# Patient Record
Sex: MALE | Race: WHITE | NOT HISPANIC OR LATINO | Employment: FULL TIME | URBAN - METROPOLITAN AREA
[De-identification: names, ages, dates, MRNs, and addresses within clinical notes are randomized per-mention and may not be internally consistent; named-entity substitution may affect disease eponyms.]

---

## 2023-03-16 ENCOUNTER — TELEPHONE (OUTPATIENT)
Dept: PRIMARY CARE | Facility: CLINIC | Age: 58
End: 2023-03-16
Payer: COMMERCIAL

## 2023-03-16 NOTE — TELEPHONE ENCOUNTER
Pt called wanted to know if he can combine his follow up for his losartan refill and his physical together in may or does he have to come sooner

## 2023-04-05 LAB
ANION GAP IN SER/PLAS: 16 MMOL/L (ref 10–20)
CALCIUM (MG/DL) IN SER/PLAS: 10.4 MG/DL (ref 8.6–10.6)
CARBON DIOXIDE, TOTAL (MMOL/L) IN SER/PLAS: 28 MMOL/L (ref 21–32)
CHLORIDE (MMOL/L) IN SER/PLAS: 100 MMOL/L (ref 98–107)
CREATININE (MG/DL) IN SER/PLAS: 1.73 MG/DL (ref 0.5–1.3)
GFR MALE: 45 ML/MIN/1.73M2
GLUCOSE (MG/DL) IN SER/PLAS: 101 MG/DL (ref 74–99)
POTASSIUM (MMOL/L) IN SER/PLAS: 4.1 MMOL/L (ref 3.5–5.3)
SODIUM (MMOL/L) IN SER/PLAS: 140 MMOL/L (ref 136–145)
UREA NITROGEN (MG/DL) IN SER/PLAS: 26 MG/DL (ref 6–23)

## 2023-04-07 LAB
ANION GAP IN SER/PLAS: 14 MMOL/L (ref 10–20)
CALCIUM (MG/DL) IN SER/PLAS: 9.9 MG/DL (ref 8.6–10.6)
CARBON DIOXIDE, TOTAL (MMOL/L) IN SER/PLAS: 30 MMOL/L (ref 21–32)
CHLORIDE (MMOL/L) IN SER/PLAS: 99 MMOL/L (ref 98–107)
CREATININE (MG/DL) IN SER/PLAS: 1.58 MG/DL (ref 0.5–1.3)
GFR MALE: 51 ML/MIN/1.73M2
GLUCOSE (MG/DL) IN SER/PLAS: 95 MG/DL (ref 74–99)
POTASSIUM (MMOL/L) IN SER/PLAS: 4 MMOL/L (ref 3.5–5.3)
SODIUM (MMOL/L) IN SER/PLAS: 139 MMOL/L (ref 136–145)
UREA NITROGEN (MG/DL) IN SER/PLAS: 21 MG/DL (ref 6–23)

## 2023-05-04 ENCOUNTER — OFFICE VISIT (OUTPATIENT)
Dept: PRIMARY CARE | Facility: CLINIC | Age: 58
End: 2023-05-04
Payer: COMMERCIAL

## 2023-05-04 VITALS
HEIGHT: 71 IN | HEART RATE: 107 BPM | WEIGHT: 200 LBS | SYSTOLIC BLOOD PRESSURE: 137 MMHG | TEMPERATURE: 97.7 F | DIASTOLIC BLOOD PRESSURE: 78 MMHG | BODY MASS INDEX: 28 KG/M2

## 2023-05-04 DIAGNOSIS — N35.912 STRICTURE OF BULBOUS URETHRA IN MALE, UNSPECIFIED STRICTURE TYPE: ICD-10-CM

## 2023-05-04 DIAGNOSIS — I10 HYPERTENSION, UNSPECIFIED TYPE: Primary | ICD-10-CM

## 2023-05-04 PROCEDURE — 1036F TOBACCO NON-USER: CPT | Performed by: FAMILY MEDICINE

## 2023-05-04 PROCEDURE — 3075F SYST BP GE 130 - 139MM HG: CPT | Performed by: FAMILY MEDICINE

## 2023-05-04 PROCEDURE — 3078F DIAST BP <80 MM HG: CPT | Performed by: FAMILY MEDICINE

## 2023-05-04 PROCEDURE — 99213 OFFICE O/P EST LOW 20 MIN: CPT | Performed by: FAMILY MEDICINE

## 2023-05-04 RX ORDER — AZELASTINE 1 MG/ML
2 SPRAY, METERED NASAL 2 TIMES DAILY
COMMUNITY
Start: 2021-04-02 | End: 2023-05-04 | Stop reason: WASHOUT

## 2023-05-04 RX ORDER — FLUTICASONE PROPIONATE AND SALMETEROL 250; 50 UG/1; UG/1
1 POWDER RESPIRATORY (INHALATION) 2 TIMES DAILY
COMMUNITY
Start: 2020-07-29 | End: 2023-10-24 | Stop reason: SDUPTHER

## 2023-05-04 RX ORDER — LOSARTAN POTASSIUM AND HYDROCHLOROTHIAZIDE 12.5; 5 MG/1; MG/1
1 TABLET ORAL DAILY
COMMUNITY
End: 2023-05-04 | Stop reason: SDUPTHER

## 2023-05-04 RX ORDER — LOSARTAN POTASSIUM AND HYDROCHLOROTHIAZIDE 12.5; 5 MG/1; MG/1
1 TABLET ORAL DAILY
Qty: 90 TABLET | Refills: 3 | Status: SHIPPED | OUTPATIENT
Start: 2023-05-04 | End: 2023-12-04 | Stop reason: SDUPTHER

## 2023-05-04 RX ORDER — FLUTICASONE PROPIONATE AND SALMETEROL 250; 50 UG/1; UG/1
1 POWDER RESPIRATORY (INHALATION) 2 TIMES DAILY
COMMUNITY
Start: 2021-06-30 | End: 2023-10-23 | Stop reason: SDUPTHER

## 2023-05-04 RX ORDER — ALBUTEROL SULFATE 90 UG/1
2 AEROSOL, METERED RESPIRATORY (INHALATION) EVERY 4 HOURS PRN
COMMUNITY
Start: 2020-07-29 | End: 2023-10-24 | Stop reason: SDUPTHER

## 2023-05-04 NOTE — PROGRESS NOTES
This is a 58-year-old male patient who is here for follow-up      He is known to have hypertension but lately she is being worked up for a urethral stricture and will be getting urethroplasty done in June      Blood pressure is really good today    Because he was retaining urine his glomerular filtration rate is also low it has been affecting his kidney functions    He has not been using the inhalers much allergies are under control    I did not prescription for Hyzaar and advised him to come back for his annual physical

## 2023-05-30 LAB
POC CREATININE: 0.8 MG/DL (ref 0.6–1.3)
POCT GFR - DATA CONVERSION: >60

## 2023-06-07 ENCOUNTER — OFFICE VISIT (OUTPATIENT)
Dept: PRIMARY CARE | Facility: CLINIC | Age: 58
End: 2023-06-07
Payer: COMMERCIAL

## 2023-06-07 VITALS
HEART RATE: 82 BPM | WEIGHT: 201 LBS | DIASTOLIC BLOOD PRESSURE: 80 MMHG | SYSTOLIC BLOOD PRESSURE: 133 MMHG | TEMPERATURE: 97.3 F | HEIGHT: 71 IN | BODY MASS INDEX: 28.14 KG/M2

## 2023-06-07 DIAGNOSIS — Z00.00 HEALTHCARE MAINTENANCE: ICD-10-CM

## 2023-06-07 DIAGNOSIS — D22.9 NEVUS: Primary | ICD-10-CM

## 2023-06-07 LAB
ALANINE AMINOTRANSFERASE (SGPT) (U/L) IN SER/PLAS: 30 U/L (ref 10–52)
ALBUMIN (G/DL) IN SER/PLAS: 4.6 G/DL (ref 3.4–5)
ALKALINE PHOSPHATASE (U/L) IN SER/PLAS: 71 U/L (ref 33–120)
ANION GAP IN SER/PLAS: 15 MMOL/L (ref 10–20)
ASPARTATE AMINOTRANSFERASE (SGOT) (U/L) IN SER/PLAS: 25 U/L (ref 9–39)
BASOPHILS (10*3/UL) IN BLOOD BY AUTOMATED COUNT: 0.05 X10E9/L (ref 0–0.1)
BASOPHILS/100 LEUKOCYTES IN BLOOD BY AUTOMATED COUNT: 0.9 % (ref 0–2)
BILIRUBIN TOTAL (MG/DL) IN SER/PLAS: 0.7 MG/DL (ref 0–1.2)
CALCIUM (MG/DL) IN SER/PLAS: 10 MG/DL (ref 8.6–10.6)
CARBON DIOXIDE, TOTAL (MMOL/L) IN SER/PLAS: 27 MMOL/L (ref 21–32)
CHLORIDE (MMOL/L) IN SER/PLAS: 102 MMOL/L (ref 98–107)
CHOLESTEROL (MG/DL) IN SER/PLAS: 179 MG/DL (ref 0–199)
CHOLESTEROL IN HDL (MG/DL) IN SER/PLAS: 53.8 MG/DL
CHOLESTEROL/HDL RATIO: 3.3
CREATININE (MG/DL) IN SER/PLAS: 1.36 MG/DL (ref 0.5–1.3)
EOSINOPHILS (10*3/UL) IN BLOOD BY AUTOMATED COUNT: 0.13 X10E9/L (ref 0–0.7)
EOSINOPHILS/100 LEUKOCYTES IN BLOOD BY AUTOMATED COUNT: 2.2 % (ref 0–6)
ERYTHROCYTE DISTRIBUTION WIDTH (RATIO) BY AUTOMATED COUNT: 13 % (ref 11.5–14.5)
ERYTHROCYTE MEAN CORPUSCULAR HEMOGLOBIN CONCENTRATION (G/DL) BY AUTOMATED: 35.6 G/DL (ref 32–36)
ERYTHROCYTE MEAN CORPUSCULAR VOLUME (FL) BY AUTOMATED COUNT: 104 FL (ref 80–100)
ERYTHROCYTES (10*6/UL) IN BLOOD BY AUTOMATED COUNT: 3.71 X10E12/L (ref 4.5–5.9)
GFR MALE: 61 ML/MIN/1.73M2
GLUCOSE (MG/DL) IN SER/PLAS: 93 MG/DL (ref 74–99)
HEMATOCRIT (%) IN BLOOD BY AUTOMATED COUNT: 38.5 % (ref 41–52)
HEMOGLOBIN (G/DL) IN BLOOD: 13.7 G/DL (ref 13.5–17.5)
IMMATURE GRANULOCYTES/100 LEUKOCYTES IN BLOOD BY AUTOMATED COUNT: 0.7 % (ref 0–0.9)
LDL: 78 MG/DL (ref 0–99)
LEUKOCYTES (10*3/UL) IN BLOOD BY AUTOMATED COUNT: 5.9 X10E9/L (ref 4.4–11.3)
LYMPHOCYTES (10*3/UL) IN BLOOD BY AUTOMATED COUNT: 1.04 X10E9/L (ref 1.2–4.8)
LYMPHOCYTES/100 LEUKOCYTES IN BLOOD BY AUTOMATED COUNT: 17.7 % (ref 13–44)
MONOCYTES (10*3/UL) IN BLOOD BY AUTOMATED COUNT: 0.47 X10E9/L (ref 0.1–1)
MONOCYTES/100 LEUKOCYTES IN BLOOD BY AUTOMATED COUNT: 8 % (ref 2–10)
NEUTROPHILS (10*3/UL) IN BLOOD BY AUTOMATED COUNT: 4.15 X10E9/L (ref 1.2–7.7)
NEUTROPHILS/100 LEUKOCYTES IN BLOOD BY AUTOMATED COUNT: 70.5 % (ref 40–80)
NON HDL CHOLESTEROL: 125 MG/DL
NRBC (PER 100 WBCS) BY AUTOMATED COUNT: 0 /100 WBC (ref 0–0)
PLATELETS (10*3/UL) IN BLOOD AUTOMATED COUNT: 239 X10E9/L (ref 150–450)
POTASSIUM (MMOL/L) IN SER/PLAS: 4.3 MMOL/L (ref 3.5–5.3)
PROSTATE SPECIFIC ANTIGEN,SCREEN: 2.23 NG/ML (ref 0–4)
PROTEIN TOTAL: 7.3 G/DL (ref 6.4–8.2)
SODIUM (MMOL/L) IN SER/PLAS: 140 MMOL/L (ref 136–145)
THYROTROPIN (MIU/L) IN SER/PLAS BY DETECTION LIMIT <= 0.05 MIU/L: 1.56 MIU/L (ref 0.44–3.98)
TRIGLYCERIDE (MG/DL) IN SER/PLAS: 236 MG/DL (ref 0–149)
UREA NITROGEN (MG/DL) IN SER/PLAS: 21 MG/DL (ref 6–23)
VLDL: 47 MG/DL (ref 0–40)

## 2023-06-07 PROCEDURE — 84443 ASSAY THYROID STIM HORMONE: CPT

## 2023-06-07 PROCEDURE — 84153 ASSAY OF PSA TOTAL: CPT

## 2023-06-07 PROCEDURE — 99396 PREV VISIT EST AGE 40-64: CPT | Performed by: FAMILY MEDICINE

## 2023-06-07 PROCEDURE — 1036F TOBACCO NON-USER: CPT | Performed by: FAMILY MEDICINE

## 2023-06-07 PROCEDURE — 80053 COMPREHEN METABOLIC PANEL: CPT

## 2023-06-07 PROCEDURE — 80061 LIPID PANEL: CPT

## 2023-06-07 PROCEDURE — 85025 COMPLETE CBC W/AUTO DIFF WBC: CPT

## 2023-06-07 NOTE — PROGRESS NOTES
"Subjective   Patient ID: Jef Sheikh is a 58 y.o. male who presents for Annual Exam.    HPI     Review of Systems   All other systems reviewed and are negative.      Objective   /80   Pulse 82   Temp 36.3 °C (97.3 °F)   Ht 1.803 m (5' 11\")   Wt 91.2 kg (201 lb)   BMI 28.03 kg/m²     Physical Exam  Eyes:      Pupils: Pupils are equal, round, and reactive to light.   Cardiovascular:      Rate and Rhythm: Normal rate.   Pulmonary:      Effort: Pulmonary effort is normal.   Abdominal:      General: Abdomen is flat.   Musculoskeletal:         General: Normal range of motion.      Cervical back: Normal range of motion.   Skin:     General: Skin is warm.   Neurological:      General: No focal deficit present.      Mental Status: He is alert.         Assessment/Plan     I saw and evaluated the patient. I personally obtained the key and critical portions of the history and physical exam. I reviewed the student 's documentation and discussed the patient with the student. I agree with the student medical decision making as documented in the student's note    This is a 58-year-old male patient here for his annual physical exam    His blood pressure is much better continuing to take Hyzaar    Both the ears have very hard wax advised him to use hydrogen peroxide 2 to 3 drops for 2 weeks and maybe repeat that in another couple of months    When he comes back for his follow-up in 6 months I will examine his ears again and if necessary attempt to syringe out the wax    I will draw all the routine blood work    Advised him to see the dermatologist         "

## 2023-06-07 NOTE — PROGRESS NOTES
Pt is a 57 yo male reporting for annual physical.     Patient states that he is feeling good. He exercises daily, does a lot of walking, elliptical, and weights. He aims to lose 15 lb but is aware that diet can be improved. Has BP monitor in a drawer but is not consistently using it. States that he sleeps pretty well.     I advised pt to try to use BP monitor at least weekly for his own benefit.     Pt has not yet seen sleep apnea specialist.    June 22nd is the date scheduled for his urethroplasty. Abdominal and pelvic CT was just performed last week.     Cologuard was last done in 2021.     Pt sees eye doctor and dentist regularly. I advised pt to schedule appointment with dermatologist as soon as possible to take a look at nevi.     I also advised patient on how to remove ear wax using hydrogen peroxide, as he had substantial amount of buildup bilaterally upon inspection.     Follow-up is scheduled for 6 months.    Meds  Albuterol  Fluticasone  Flonase (OTC)  Losartan

## 2023-06-07 NOTE — PATIENT INSTRUCTIONS
For your ear wax, use hydrogen peroxide and apply 2-3 drops in each ear. Lie down on one side and apply, leave for 1 min, then turn to other side and do the same. Please do this for 2 weeks now, and then again in 3 months. We will take a look at your ears at your next follow-up in 6 months.  Please schedule appointment with dermatologist at  as soon as possible to take a look at your skin.

## 2023-06-22 ENCOUNTER — HOSPITAL ENCOUNTER (OUTPATIENT)
Dept: DATA CONVERSION | Facility: HOSPITAL | Age: 58
End: 2023-06-22
Attending: STUDENT IN AN ORGANIZED HEALTH CARE EDUCATION/TRAINING PROGRAM | Admitting: STUDENT IN AN ORGANIZED HEALTH CARE EDUCATION/TRAINING PROGRAM
Payer: COMMERCIAL

## 2023-06-22 DIAGNOSIS — G47.33 OBSTRUCTIVE SLEEP APNEA (ADULT) (PEDIATRIC): ICD-10-CM

## 2023-06-22 DIAGNOSIS — J45.909 UNSPECIFIED ASTHMA, UNCOMPLICATED (HHS-HCC): ICD-10-CM

## 2023-06-22 DIAGNOSIS — I10 ESSENTIAL (PRIMARY) HYPERTENSION: ICD-10-CM

## 2023-06-22 DIAGNOSIS — N35.919 UNSPECIFIED URETHRAL STRICTURE, MALE, UNSPECIFIED SITE: ICD-10-CM

## 2023-06-22 DIAGNOSIS — N40.0 BENIGN PROSTATIC HYPERPLASIA WITHOUT LOWER URINARY TRACT SYMPTOMS: ICD-10-CM

## 2023-08-30 PROBLEM — R33.9 URINARY RETENTION: Status: ACTIVE | Noted: 2023-08-30

## 2023-08-30 PROBLEM — K43.9 VENTRAL HERNIA: Status: ACTIVE | Noted: 2023-08-30

## 2023-08-30 PROBLEM — G47.33 OBSTRUCTIVE SLEEP APNEA OF ADULT: Status: ACTIVE | Noted: 2023-08-30

## 2023-08-30 PROBLEM — N35.919 URETHRAL STRICTURE: Status: ACTIVE | Noted: 2023-08-30

## 2023-08-30 PROBLEM — I10 BENIGN ESSENTIAL HTN: Status: ACTIVE | Noted: 2023-08-30

## 2023-08-30 PROBLEM — D22.9 MULTIPLE NEVI: Status: ACTIVE | Noted: 2023-08-30

## 2023-08-30 PROBLEM — R79.89 ELEVATED SERUM CREATININE: Status: ACTIVE | Noted: 2023-08-30

## 2023-08-30 RX ORDER — AZELASTINE 1 MG/ML
2 SPRAY, METERED NASAL 2 TIMES DAILY
COMMUNITY
Start: 2021-04-02

## 2023-08-30 RX ORDER — FLUTICASONE PROPIONATE 50 MCG
2 SPRAY, SUSPENSION (ML) NASAL 2 TIMES DAILY
COMMUNITY
Start: 2021-04-02

## 2023-08-30 RX ORDER — SULFAMETHOXAZOLE AND TRIMETHOPRIM 800; 160 MG/1; MG/1
1 TABLET ORAL 2 TIMES DAILY
COMMUNITY
Start: 2023-06-20 | End: 2023-09-08 | Stop reason: ALTCHOICE

## 2023-09-07 VITALS — HEIGHT: 71 IN | WEIGHT: 201.28 LBS | BODY MASS INDEX: 28.18 KG/M2

## 2023-09-08 ENCOUNTER — TELEMEDICINE (OUTPATIENT)
Dept: PRIMARY CARE | Facility: CLINIC | Age: 58
End: 2023-09-08
Payer: COMMERCIAL

## 2023-09-08 ENCOUNTER — APPOINTMENT (OUTPATIENT)
Dept: PRIMARY CARE | Facility: CLINIC | Age: 58
End: 2023-09-08
Payer: COMMERCIAL

## 2023-09-08 DIAGNOSIS — J01.90 ACUTE NON-RECURRENT SINUSITIS, UNSPECIFIED LOCATION: Primary | ICD-10-CM

## 2023-09-08 PROCEDURE — 99213 OFFICE O/P EST LOW 20 MIN: CPT | Performed by: FAMILY MEDICINE

## 2023-09-08 RX ORDER — DOXYCYCLINE 100 MG/1
100 CAPSULE ORAL 2 TIMES DAILY
Qty: 14 CAPSULE | Refills: 0 | Status: SHIPPED | OUTPATIENT
Start: 2023-09-08 | End: 2023-09-15

## 2023-09-08 ASSESSMENT — LIFESTYLE VARIABLES
HISTORY_OF_SMOKING: I HAVE NEVER SMOKED
HISTORY_OF_SMOKING: I HAVE NEVER SMOKED

## 2023-09-08 NOTE — PATIENT INSTRUCTIONS
Please send me a Net[employer]art message if you have any questions or concerns.  FOR NON URGENT questions only.  Allow up to 72 hours for response.    If you have prescription issues or other questions you can email   Niki Waller  Digital Health Coordinator, at   [email or web address]    Sinus sxs---8 days -sinusitis vs viral URI  Not getting better but not getting worse  Recommend waiting 24-48 hours and if not improving, start doxy as written  Discussed photosensitivity and sunscreen as well as taking with plenty of water    Rest and drink plenty of fluids    Tylenol and or motrin as needed for pain and fever (unless you have been told not to take these because of your personal medical history)    Discussed options and precautions:   Viral versus bacterial infection; use of medications; possible side effects; appropriate over-the-counter medications; possible complications and /or when to follow-up.    Follow-up in 1 to 2 days if not improving.  Follow-up immediately if symptoms worsen.    All red flags requiring in person care were discussed.  All patient's questions were answered.    Limitations to telemedicine include inability to do a complete and accurate physical exam.  Any concerns regarding this were conveyed with the patient and in person follow-up recommended if patient nature of illness does not progress as anticipated during this visit.     Over-the-counter cold and cough medications    Take Mucinex for cough, drink plenty of fluids with this medication and will help break up congestion making it easier to cough up    For cough can use honey (children ages 1 and up) in hot tea or hot water. I recommend putting this in an insulated cup and carrying it around throughout the day to sip on.  Have it at your bedside at night in case you wake up coughing.  You can also use honey cough drops (adults and older children).    Recommend nasal saline for use in children and adults.  Neti Serra can also be  helpful.  (Never used tap water and a Neti pot.  Use distilled water.)    If you have plugged up congested ears or the feeling of fluid in your ears, you can use an over-the-counter nasal steroid spray like fluticasone (brand name Flonase) use 2 sprays into each nostril once or twice a day for 7 days.  This will help open up the eustachian tubes and allow the fluid to drain out of your ears.    Sleeping with your head/chest elevated can help with sinus drainage.    Adults only-can use nasal decongestant (like Afrin) at bedtime to open nasal passages so you can breathe through your nose while you sleep; avoid using for longer than 3 days as this medication can become addicting.  Do not use if you have high blood pressure or high heart rate.    For severe pain or fever in adult-Tylenol (2 extra strength) or ibuprofen (3-4 tabs equals 600 to 800 mg) alternating as needed for pain.  Tylenol doses should be 6 to 8 hours apart and ibuprofen doses should be 6 to 8 hours apart.        Common cold medications for adults explained:    **Cold medications for children are not recommended-only Tylenol, Motrin, honey (only for children older than 1 year), cool-mist humidifier, and nasal saline spray are recommended for children.    Mucinex-(generic name guaifenesin)-is an expectorant.  This will thin out all the thick mucus.  Must drink plenty of liquids for this medicine to work.    Dextromethorphan (brand name Delsym or DM)-this medicine is a cough suppressant    Honey in hot water or tea-this is a natural cough suppressant    Decongestant nasal spray- (eg: Afrin) use for temporary relief of nasal congestion-best when used at bedtime to open up nasal passages so that you are not forced to mouth breathe overnight.    Sudafed (generic name pseudoephedrine-this must be bought from the pharmacist) DO NOT use this medicine if you have high blood pressure as it can raise your blood pressure higher.  Do not use if you have any  irregular heart rate.  This medicine can help clear congestion in your sinuses.

## 2023-09-08 NOTE — PROGRESS NOTES
Sinus sxs since August 31st  Was traveling recently- staying in cabins-others sick on trip--   Last sinusitis was 7+ years  8 days total--slightly getting better but has periods where feels better--worse as day goes on-- peaked a few nights ago and seems to be improving--now waking at night now--  Tickle cough  Green and yellow drainage  Tuesday (-) COVID test  No aches  Sl HA  Feels pressure in jaw area  No sinus TTP  Right sided preauricular LN TTP    ALL OTHER ROS (-)    General appearance:  Vitals available from patient?No  Alert, oriented, pleasant, in no apparent distress Yes  Answers questions appropriatelyYes  Eyes clear?Yes  Throat exam Not Available  Is patient in respiratory distressYes  Is patient coughing during consult:No  Audible wheezing noted? :No  Pt sounds congested?: Yes  Sniffing or rhinorrhea?: No  Frontal sinus TTP by palpation? No  Maxillary sinus TTP by palpation?No  Tender neck LAD by pt exam?:No  Preauricular LAD by pt exam?:No  Abdominal TTP by pt exam?:N/A  CVS tenderness by pt exam?N/A  Psychiatric: Affect normalYes  Other relevant physical exam:      Pt location in [place] and consent obtained. Telemedicine appropriate evaluation completed. Appropriate physical exam done.     Sinus sxs---8 days -sinusitis vs viral URI  Not getting better but not getting worse  Recommend waiting 24-48 hours and if not improving, start doxy as written  Discussed photosensitivity and sunscreen as well as taking with plenty of water

## 2023-09-30 NOTE — H&P
History & Physical Reviewed:   I have reviewed the History and Physical dated:  22-Jun-2023   History and Physical reviewed and relevant findings noted. Patient examined to review pertinent physical  findings.: No significant changes   Home Medications Reviewed: no changes noted   Allergies Reviewed: no changes noted       ERAS (Enhanced Recovery After Surgery):  ·  ERAS Patient: no     Consent:   COVID-19 Consent:  ·  COVID-19 Risk Consent Surgeon has reviewed key risks related to the risk of fredy COVID-19 and if they contract COVID-19 what the risks are.     Attestation:   Note Completion:  I am a:  Resident/Fellow   Attending Attestation I saw and evaluated the patient.  I personally obtained the key and critical portions of the history and physical exam or was physically present for key and  critical portions performed by the resident/fellow. I reviewed the resident/fellow?s documentation and discussed the patient with the resident/fellow.  I agree with the resident/fellow?s medical decision making as documented in the note.     I personally evaluated the patient on 22-Jun-2023         Electronic Signatures:  Matthew Fernandez (Resident))  (Signed 22-Jun-2023 06:58)   Authored: History & Physical Reviewed, ERAS, Consent,  Note Completion  Dylan Temple)  (Signed 22-Jun-2023 10:49)   Authored: Note Completion   Co-Signer: History & Physical Reviewed, ERAS, Consent, Note Completion      Last Updated: 22-Jun-2023 10:49 by Dylan Temple)

## 2023-10-02 NOTE — OP NOTE
Post Operative Note:     PreOp Diagnosis: Urethral stricture   Post-Procedure Diagnosis: same   Procedure: 1. Excision and primary anastomosis urethroplasty  2. Cystoscopy  3. Local tissue re-arrangement 3cm x 2cm for dartos flap coverage   Surgeon: Dylan Temple   Resident/Fellow/Other Assistant: Jim   Anesthesia: General   Estimated Blood Loss (mL): none  20cc   Specimen: yes. Urethral stricture   Complications: None   Findings: 2 cm mid-distal bulbar urethral stricture.  wide open urethra after excision of scar and very good anastomosis with no obvious evidence of leak. Normal bladder mucosa with no stones, tumors or lesions   Drains and/or Catheters: 14Fr rojas catheter     Operative Report Dictated:  Dictation: not applicable - note contains Operative  Report   Operative Report:    OPERATIVE NOTE:    The patient was seen in the preoperative area where the risks, benefits, and indications were discussed with the patient.  The patient agreed to proceed with the procedure.  At this time, he was    taken back to the operating room, where general anesthesia was induced. Perioperative antibiotics (Ancef) given. He was placed in a supine lithotomy position and prepped and draped in a standard sterile fashion.     At this time, a midline incision was made in the perineum.  Subcutaneous tissue was dissected along the bulbospongiosus. Lonestar retractor used to aide in exposure. The urethra was carefully dissected and circumferential control of the urethra was established.   We initially gently placed a 14 Vietnamese straight catheter to the level of the stricture, to assess where it anatomically was situated in the perineum.  At this time we performed cystourethroscopy with a flexible cystoscope.  Here we noticed a narrow approximately  5 Vietnamese stricture in the bulbar urethra that seem to proceed for approximately 2 cm.  We were unable to look past the stricture.  Under direct vision we identified the stricture  through the perineum.  We placed vascular clamps proximally.  Cystoscope  was removed.  Using a 10 blade scalpel we transected the corpus spongiosum and urethra.  The distal urethral edge was noted to hold the stricture, this was excised and sent off the field for pathologic review.  We then spatulated the distal urethral segment.   An 24 Russian bougie easily passed through here.  We then turned our attention to the proximal urethral segment.  Here we spatulated the urethra.  Initially we had some difficulty passing a 18 Russian bougie. We did find some narrowing of the urethra here,  and proceeded to incise this area.  We were able to dilate to 28 Russian bougie easily thereafter.    We perform cystourethroscopy from the proximal urethral stump.  No urethral strictures remained proximally.  Normal prostate.  Bladder was normal without masses stones or lesions. Cystoscope was removed.    The urethra was not on tension and easily reached the proximal urethra.  Some Myers's fascia was required to be dissected to allow for adequate reach, however no other adjunctive maneuvers were needed to make a tension-free anastomosis.  We then began  to reapproximate the urethra in a primary fashion.  Full-thickness approximation of urethra and the sponge were taken between the 9:00 to 3:00 along the dorsal edge using 5-0 PDS.  These stitches were snapped to be tied later.  We then began reapproximating  the ventral urethral stitches, again using 5-0 PDS.  We then tied down the dorsal full-thickness sutures.    We then proceeded to tie down all ventral sutures.  In all, the urethra was reapproximated using 8 points of fixation.  A 14-Russian catheter was advanced without any difficulty.  Return of clear yellow urine and balloon was inflated with 10 cc sterile  water. The ventral corporal defect was reapproximated using a running 5-0 PDS.  The bulbospongiosus muscle was then reapproximated using 3-0 Vicryl in a running fashion. A flap  of Dartos was created off the anatomic left side 3cm x 2cm in size to close  subcutaneously which was secured with 3-0 Vicryl.  Subcutaneous tissue was subsequently approximated using 3-0 Vicryl continuous running fashion.  Skin was closed using 5-0 Monocryl in a horizontal running mattress fashion, with good reapproximation.  The incision was cleaned and dried and LiquiBand was applied to the incision.  Fluffs and scrotal support were applied. Loyola catheter secured with StatLock.  Patient was then awoken from anesthesia and taken to the PACU in stable condition.       FOLLOWUP:    The urethral catheter will be maintained for 2 weeks, and the patient will undergo a trial of void at that time.    Attestation:   Note Completion:  I am a: Resident/Fellow   Attending Attestation I was present for the entire procedure          Electronic Signatures:  Matthew Fernandez (Resident))  (Signed 22-Jun-2023 13:13)   Authored: Post Operative Note, Note Completion  Dylan Temple)  (Signed 29-Jun-2023 13:10)   Authored: Post Operative Note, Note Completion   Co-Signer: Post Operative Note, Note Completion      Last Updated: 29-Jun-2023 13:10 by Dylan Temple)

## 2023-10-03 ENCOUNTER — OFFICE VISIT (OUTPATIENT)
Dept: UROLOGY | Facility: CLINIC | Age: 58
End: 2023-10-03
Payer: COMMERCIAL

## 2023-10-03 VITALS — HEIGHT: 71 IN | BODY MASS INDEX: 27.58 KG/M2 | WEIGHT: 197 LBS

## 2023-10-03 DIAGNOSIS — N31.9 NEUROGENIC BLADDER: ICD-10-CM

## 2023-10-03 DIAGNOSIS — N35.919 STRICTURE OF MALE URETHRA, UNSPECIFIED STRICTURE TYPE: Primary | ICD-10-CM

## 2023-10-03 PROCEDURE — 1036F TOBACCO NON-USER: CPT | Performed by: STUDENT IN AN ORGANIZED HEALTH CARE EDUCATION/TRAINING PROGRAM

## 2023-10-03 PROCEDURE — 99214 OFFICE O/P EST MOD 30 MIN: CPT | Performed by: STUDENT IN AN ORGANIZED HEALTH CARE EDUCATION/TRAINING PROGRAM

## 2023-10-03 PROCEDURE — 51736 URINE FLOW MEASUREMENT: CPT | Performed by: STUDENT IN AN ORGANIZED HEALTH CARE EDUCATION/TRAINING PROGRAM

## 2023-10-03 PROCEDURE — 51798 US URINE CAPACITY MEASURE: CPT | Performed by: STUDENT IN AN ORGANIZED HEALTH CARE EDUCATION/TRAINING PROGRAM

## 2023-10-03 NOTE — PROGRESS NOTES
Matias presents for a post-op evaluation.   The patient’s EMR has been reviewed.  Lives in [place], [place].  Initially referred by Dr. Davila.     H/o bulbar urethral stricture disease with weak stream and urinary retention.  S/p excision and primary anastomosis urethroplasty w/ local tissue rearrangement with me (06/22/23). Pathology was benign.     SUBJECTIVE:  HPI   TODAY (10/03/23)  Reports he has been doing well overall.  Remains very satisfied with the results of the procedure.   Post-operatively, his stream has remained strong and he feels he empties well.   Denies any recent UTIs, gross hematuria, dysuria, fevers or chills.   Denies any penile discomfort, discharge,  swelling or abnormal coloration.    Uroflow results:  Qmax: 19 mL/s, normal bell-curve  VV: 226 mL  PVR: 17 mL    TO REVIEW: Initial evaluation   Previously evaluated by Dr. Davila.  Referred to me for an evaluation and surgical consideration.   Reports gradual weakening of his stream since he was a child.  S/p urethral dilation x1 in the past, maybe in his early 20's.   Over recent years he has noticed that he has had to strain more.   Thus, proceeded to be evaluated by Dr. Davila recently.  Flow/pvr demonstrated a peak flow rate of 2.7 mL/s.  Post-void residual was 1455 mL.  Renal function showed ABELARDO with GFR 45, Cr 1.75 (04/05/23)  S/p 16 Fr Loyola cath placement, Abelardo since improved.   Latest GFR 51, Cr 1.58 (04/07/23).    Patient notes an event of developing a perineal hematoma 2/2 golfing in 2012.  Otherwise, denies an inciting event or  trauma.  Denies any dysuria, gross hematuria, flank pain, abd pain, nausea, vomiting, fevers or chills. Denies hx of recent UTIs or stones.     Review of his latest CANDIE demonstrated a hypoechoic mass of the bladder vs hematoma (04/05/23). Last PSA <1.0 (May 2022).     Past medical, surgical, family and social history in the chart was reviewed and is accurate including any additions to what is in this  HPI.    Review of Systems   Constitutional: denies any unintentional weight loss or change in strength.  Integumentary: denies any rashes or pruritus.  Eyes: denies any double vision or eye pain.  Ear/Nose/Mouth/Throat: denies any nosebleeds or gum bleeds.  Cardiovascular: denies any chest pain or syncope.  Respiratory: denies hemoptysis.  Gastrointestinal: denies nausea or vomiting.  Musculoskeletal: denies muscle cramping or weakness.  Neurologic: denies convulsions or seizures.  Hematologic/Lymphatic: denies bleeding tendencies.  Endocrine: denies heat/cold intolerance.  All other systems have been reviewed and are negative unless otherwise noted in the HPI.    OBJECTIVE:  There were no vitals taken for this visit.  Physical Exam   Constitutional: No obvious distress.  Eyes: Non-injected conjunctiva, sclera clear, EOMI.  Ears/Nose/Mouth/Throat: No obvious drainage per ears or nose.  Cardiovascular: Extremities are warm and well perfused. No edema, cyanosis or pallor.  Respiratory: No audible wheezing/stridor; respirations do not appear labored.  Gastrointestinal: Abdomen soft, not distended.  Musculoskeletal: Normal ROM of extremities.  Skin: No obvious rashes or open sores.  Neurologic: Alert and oriented, CN 2-12 grossly intact.  Psychiatric: Answers questions appropriately with normal affect.  Hematologic/Lymphatic/Immunologic: No obvious bruises or sites of spontaneous bleeding.  Genitourinary: No CVA tenderness, bladder not palpable.     Labs and imaging personally reviewed:  Lab Results   Component Value Date    WBC 5.9 06/19/2023    HGB 13.9 06/19/2023    HCT 39.0 (L) 06/19/2023     06/19/2023    CHOL 179 06/07/2023    TRIG 236 (H) 06/07/2023    HDL 53.8 06/07/2023    ALT 30 06/07/2023    AST 25 06/07/2023     06/19/2023    K 4.2 06/19/2023     06/19/2023    CREATININE 1.19 06/19/2023    BUN 18 06/19/2023    CO2 26 06/19/2023    TSH 1.56 06/07/2023    INR 0.9 06/19/2023    HGBA1C 4.8  07/28/2020     === 05/30/23 ===  CT UROGRAPHY WITH 3D VOLUME RENDERED IMAGING  - Impression -  1.  Distended urinary bladder with diffuse mural thickening most  compatible with changes related to mechanical outlet obstruction from  known primary urethral stricture.  2. Suspect symmetric probably benign stricture distal portion of the  ureters bilateral.  3.  A non-calcified pulmonary nodule/ multiple non-calcified  pulmonary nodules measuring less than 6 mm, likely benign. No further  follow-up is required.  4. Probably benign liver findings including too small to characterize  homogeneous hypodensities most suggestive of incidental finding such  as cyst and nonspecific transient hyperenhancement right lobe more  likely benign, incidental such as a perfusion variant.   5. Trace atherosclerosis.   === 04/05/23 ===  US RENAL COMPLETE  - Impression -  Large hypoechoic mass versus hematoma adjacent to the bladder.  Loyola catheter in the bladder. Evaluation limited due to lack of  distention. Questionable bladder wall thickening.    ASSESSMENT:  Problem List Items Addressed This Visit       Urethral stricture - Primary    Relevant Orders    Measure post void residual (Completed)    Follow Up In Urology     PLAN:  Reports he has been doing very well overall.  Remains very satisfied with results of the procedure.   Plan to RTC in 9 months for reassessment and flow/pvr.     All questions were answered to the patient’s satisfaction.  Patient agrees with the plan and wishes to proceed.    Scribed for Dr. Dylan Temple by Manpreet Alvarez.  I, Dr. Dylan Temple have personally reviewed and agreed with the information entered by the Virtual Scribe. 10/03/23, 3:49 PM

## 2023-10-23 DIAGNOSIS — J01.90 ACUTE NON-RECURRENT SINUSITIS, UNSPECIFIED LOCATION: Primary | ICD-10-CM

## 2023-10-23 RX ORDER — FLUTICASONE PROPIONATE AND SALMETEROL 250; 50 UG/1; UG/1
1 POWDER RESPIRATORY (INHALATION) 2 TIMES DAILY
Qty: 60 EACH | Refills: 3 | Status: SHIPPED | OUTPATIENT
Start: 2023-10-23 | End: 2023-12-04 | Stop reason: SDUPTHER

## 2023-10-24 DIAGNOSIS — R05.9 COUGH, UNSPECIFIED TYPE: Primary | ICD-10-CM

## 2023-10-24 RX ORDER — ALBUTEROL SULFATE 90 UG/1
2 AEROSOL, METERED RESPIRATORY (INHALATION) EVERY 4 HOURS PRN
Qty: 18 G | Refills: 3 | Status: SHIPPED | OUTPATIENT
Start: 2023-10-24 | End: 2023-12-04 | Stop reason: SDUPTHER

## 2023-10-24 RX ORDER — FLUTICASONE PROPIONATE AND SALMETEROL 250; 50 UG/1; UG/1
1 POWDER RESPIRATORY (INHALATION)
Qty: 60 EACH | Refills: 3 | Status: SHIPPED | OUTPATIENT
Start: 2023-10-24

## 2023-12-04 ENCOUNTER — OFFICE VISIT (OUTPATIENT)
Dept: PRIMARY CARE | Facility: CLINIC | Age: 58
End: 2023-12-04
Payer: COMMERCIAL

## 2023-12-04 VITALS
HEIGHT: 71 IN | TEMPERATURE: 97.8 F | SYSTOLIC BLOOD PRESSURE: 128 MMHG | DIASTOLIC BLOOD PRESSURE: 88 MMHG | WEIGHT: 195.4 LBS | HEART RATE: 109 BPM | BODY MASS INDEX: 27.35 KG/M2

## 2023-12-04 DIAGNOSIS — I10 HYPERTENSION, UNSPECIFIED TYPE: ICD-10-CM

## 2023-12-04 DIAGNOSIS — E55.9 VITAMIN D DEFICIENCY: ICD-10-CM

## 2023-12-04 DIAGNOSIS — E78.9 LIPID DISORDER: ICD-10-CM

## 2023-12-04 DIAGNOSIS — J01.90 ACUTE NON-RECURRENT SINUSITIS, UNSPECIFIED LOCATION: ICD-10-CM

## 2023-12-04 DIAGNOSIS — R05.9 COUGH, UNSPECIFIED TYPE: ICD-10-CM

## 2023-12-04 DIAGNOSIS — N18.9 CHRONIC RENAL FAILURE, UNSPECIFIED CKD STAGE: Primary | ICD-10-CM

## 2023-12-04 DIAGNOSIS — D53.1 MEGALOBLASTIC ANEMIA: ICD-10-CM

## 2023-12-04 PROCEDURE — 99214 OFFICE O/P EST MOD 30 MIN: CPT | Performed by: FAMILY MEDICINE

## 2023-12-04 PROCEDURE — 3074F SYST BP LT 130 MM HG: CPT | Performed by: FAMILY MEDICINE

## 2023-12-04 PROCEDURE — 1036F TOBACCO NON-USER: CPT | Performed by: FAMILY MEDICINE

## 2023-12-04 PROCEDURE — 3079F DIAST BP 80-89 MM HG: CPT | Performed by: FAMILY MEDICINE

## 2023-12-04 PROCEDURE — 36415 COLL VENOUS BLD VENIPUNCTURE: CPT

## 2023-12-04 RX ORDER — ALBUTEROL SULFATE 90 UG/1
2 AEROSOL, METERED RESPIRATORY (INHALATION) EVERY 4 HOURS PRN
Qty: 18 G | Refills: 3 | Status: SHIPPED | OUTPATIENT
Start: 2023-12-04

## 2023-12-04 RX ORDER — LOSARTAN POTASSIUM AND HYDROCHLOROTHIAZIDE 12.5; 5 MG/1; MG/1
1 TABLET ORAL DAILY
Qty: 90 TABLET | Refills: 3 | Status: SHIPPED | OUTPATIENT
Start: 2023-12-04

## 2023-12-04 RX ORDER — FLUTICASONE PROPIONATE AND SALMETEROL 250; 50 UG/1; UG/1
1 POWDER RESPIRATORY (INHALATION) 2 TIMES DAILY
Qty: 60 EACH | Refills: 3 | Status: SHIPPED | OUTPATIENT
Start: 2023-12-04

## 2023-12-04 ASSESSMENT — DERMATOLOGY QUALITY OF LIFE (QOL) ASSESSMENT
RATE HOW BOTHERED YOU ARE BY EFFECTS OF YOUR SKIN PROBLEMS ON YOUR ACTIVITIES (EG, GOING OUT, ACCOMPLISHING WHAT YOU WANT, WORK ACTIVITIES OR YOUR RELATIONSHIPS WITH OTHERS): 0 - NEVER BOTHERED
WHAT SINGLE SKIN CONDITION LISTED BELOW IS THE PATIENT ANSWERING THE QUALITY-OF-LIFE ASSESSMENT QUESTIONS ABOUT: NONE OF THE ABOVE
WHAT SINGLE SKIN CONDITION LISTED BELOW IS THE PATIENT ANSWERING THE QUALITY-OF-LIFE ASSESSMENT QUESTIONS ABOUT: NONE OF THE ABOVE
RATE HOW EMOTIONALLY BOTHERED YOU ARE BY YOUR SKIN PROBLEM (FOR EXAMPLE, WORRY, EMBARRASSMENT, FRUSTRATION): 0 - NEVER BOTHERED
RATE HOW EMOTIONALLY BOTHERED YOU ARE BY YOUR SKIN PROBLEM (FOR EXAMPLE, WORRY, EMBARRASSMENT, FRUSTRATION): 0 - NEVER BOTHERED
RATE HOW BOTHERED YOU ARE BY EFFECTS OF YOUR SKIN PROBLEMS ON YOUR ACTIVITIES (EG, GOING OUT, ACCOMPLISHING WHAT YOU WANT, WORK ACTIVITIES OR YOUR RELATIONSHIPS WITH OTHERS): 0 - NEVER BOTHERED
RATE HOW BOTHERED YOU ARE BY SYMPTOMS OF YOUR SKIN PROBLEM (EG, ITCHING, STINGING BURNING, HURTING OR SKIN IRRITATION): 0 - NEVER BOTHERED
RATE HOW BOTHERED YOU ARE BY SYMPTOMS OF YOUR SKIN PROBLEM (EG, ITCHING, STINGING BURNING, HURTING OR SKIN IRRITATION): 0 - NEVER BOTHERED

## 2023-12-04 NOTE — PROGRESS NOTES
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  When I got to know that this happen I spoke to him and advised him to go to one of the  labs to redraw his labs this was told to him on December 5 evening

## 2023-12-06 ENCOUNTER — OFFICE VISIT (OUTPATIENT)
Dept: DERMATOLOGY | Facility: CLINIC | Age: 58
End: 2023-12-06
Payer: COMMERCIAL

## 2023-12-06 DIAGNOSIS — L56.8 OTHER SPECIFIED ACUTE SKIN CHANGES DUE TO ULTRAVIOLET RADIATION: ICD-10-CM

## 2023-12-06 DIAGNOSIS — D22.9 MELANOCYTIC NEVUS, UNSPECIFIED LOCATION: Primary | ICD-10-CM

## 2023-12-06 DIAGNOSIS — Z12.83 SKIN CANCER SCREENING: ICD-10-CM

## 2023-12-06 DIAGNOSIS — L82.1 SEBORRHEIC KERATOSIS: ICD-10-CM

## 2023-12-06 PROCEDURE — 1036F TOBACCO NON-USER: CPT | Performed by: DERMATOLOGY

## 2023-12-06 PROCEDURE — 99203 OFFICE O/P NEW LOW 30 MIN: CPT | Performed by: DERMATOLOGY

## 2023-12-06 NOTE — PROGRESS NOTES
Subjective     Jef Sheikh is a 58 y.o. male who presents for the following: Skin Check.     No personal history of skin cancer. Adopted and unsure of family history. Plays golf and does use sunscreen. No tanning bed use.     Patient had seen dermatology in the past with few mole removals that were benign.     Review of Systems:  No other skin or systemic complaints other than what is documented elsewhere in the note.    The following portions of the chart were reviewed this encounter and updated as appropriate:          Skin Cancer History  No skin cancer on file.      Specialty Problems          Dermatology Problems    Multiple nevi        Objective   Well appearing patient in no apparent distress; mood and affect are within normal limits.    A focused skin examination was performed. All findings within normal limits unless otherwise noted below.    Scattered on the patient's face, neck, trunk, and extremities, there are multiple small, round- to oval-shaped, brown-pigmented and pink-colored, symmetric, uniform-appearing macules and dome-shaped papules   -On the right thigh is a hypopigmented patch with central tan pigmented macule c/w previous biopsy site with some recurrence but benign dermoscopy findings.     Scattered on the patient's left arm there is a tan hyperkeratotic, stuck-on appearing papules    Diffuse photodamage with actinic changes with telangiectasia and mottled pigmentation in sun-exposed areas.            Assessment/Plan   Melanocytic nevus, unspecified location    Clinically benign- to slightly atypical-appearing nevi - the clinically benign- to slightly atypical-appearing nature of the patient's nevi was discussed with the patient today.  None of the patient's nevi meet threshold for biopsy today. I also emphasized the importance of sun avoidance and sun protection with daily sunscreen use.  The patient expressed understanding and is in agreement with this plan.   - of note    Seborrheic  keratosis    Seborrheic Keratoses - the benign nature of these lesions was discussed with the patient today and reassurance provided.  No treatment is medically indicated for these lesions at this time.     Other specified acute skin changes due to ultraviolet radiation    Photodamage.  The signs and symptoms of skin cancer were reviewed and the patient was advised to practice sun protection and sun avoidance, use daily sunscreen, and perform regular self skin exams.  Sun protection was discussed, including avoiding the mid-day sun, wearing a sunscreen with SPF at least 50, and stressing the need for reapplication of sunscreen and applying more than they think they need.     Skin cancer screening    Follow up as needed      My DO Katty, PGY-3  Department of Dermatology

## 2023-12-07 ENCOUNTER — APPOINTMENT (OUTPATIENT)
Dept: PRIMARY CARE | Facility: CLINIC | Age: 58
End: 2023-12-07
Payer: COMMERCIAL

## 2023-12-16 ENCOUNTER — LAB (OUTPATIENT)
Dept: LAB | Facility: LAB | Age: 58
End: 2023-12-16
Payer: COMMERCIAL

## 2023-12-16 DIAGNOSIS — N18.9 CHRONIC RENAL FAILURE, UNSPECIFIED CKD STAGE: ICD-10-CM

## 2023-12-16 DIAGNOSIS — E55.9 VITAMIN D DEFICIENCY: ICD-10-CM

## 2023-12-16 DIAGNOSIS — E78.9 LIPID DISORDER: ICD-10-CM

## 2023-12-16 LAB
25(OH)D3 SERPL-MCNC: 28 NG/ML (ref 30–100)
ALBUMIN SERPL BCP-MCNC: 4.3 G/DL (ref 3.4–5)
ALP SERPL-CCNC: 70 U/L (ref 33–120)
ALT SERPL W P-5'-P-CCNC: 22 U/L (ref 10–52)
ANION GAP SERPL CALC-SCNC: 13 MMOL/L (ref 10–20)
AST SERPL W P-5'-P-CCNC: 20 U/L (ref 9–39)
BILIRUB SERPL-MCNC: 0.5 MG/DL (ref 0–1.2)
BUN SERPL-MCNC: 20 MG/DL (ref 6–23)
CALCIUM SERPL-MCNC: 9.8 MG/DL (ref 8.6–10.6)
CHLORIDE SERPL-SCNC: 102 MMOL/L (ref 98–107)
CHOLEST SERPL-MCNC: 185 MG/DL (ref 0–199)
CHOLESTEROL/HDL RATIO: 3.6
CO2 SERPL-SCNC: 29 MMOL/L (ref 21–32)
CREAT SERPL-MCNC: 1.24 MG/DL (ref 0.5–1.3)
GFR SERPL CREATININE-BSD FRML MDRD: 68 ML/MIN/1.73M*2
GLUCOSE SERPL-MCNC: 87 MG/DL (ref 74–99)
HDLC SERPL-MCNC: 50.9 MG/DL
LDLC SERPL CALC-MCNC: 109 MG/DL
NON HDL CHOLESTEROL: 134 MG/DL (ref 0–149)
POTASSIUM SERPL-SCNC: 4.3 MMOL/L (ref 3.5–5.3)
PROT SERPL-MCNC: 7 G/DL (ref 6.4–8.2)
SODIUM SERPL-SCNC: 140 MMOL/L (ref 136–145)
TRIGL SERPL-MCNC: 126 MG/DL (ref 0–149)
VLDL: 25 MG/DL (ref 0–40)

## 2023-12-16 PROCEDURE — 80053 COMPREHEN METABOLIC PANEL: CPT

## 2023-12-16 PROCEDURE — 36415 COLL VENOUS BLD VENIPUNCTURE: CPT

## 2023-12-16 PROCEDURE — 82306 VITAMIN D 25 HYDROXY: CPT

## 2023-12-16 PROCEDURE — 80061 LIPID PANEL: CPT

## 2024-05-15 DIAGNOSIS — R05.9 COUGH, UNSPECIFIED TYPE: ICD-10-CM

## 2024-06-11 ENCOUNTER — APPOINTMENT (OUTPATIENT)
Dept: PRIMARY CARE | Facility: CLINIC | Age: 59
End: 2024-06-11
Payer: COMMERCIAL

## 2024-07-01 ENCOUNTER — APPOINTMENT (OUTPATIENT)
Dept: PRIMARY CARE | Facility: CLINIC | Age: 59
End: 2024-07-01
Payer: COMMERCIAL

## 2024-07-01 VITALS
WEIGHT: 190.8 LBS | HEIGHT: 71 IN | HEART RATE: 75 BPM | DIASTOLIC BLOOD PRESSURE: 85 MMHG | TEMPERATURE: 97.9 F | BODY MASS INDEX: 26.71 KG/M2 | SYSTOLIC BLOOD PRESSURE: 132 MMHG

## 2024-07-01 DIAGNOSIS — J45.20 INTERMITTENT ASTHMA WITHOUT COMPLICATION, UNSPECIFIED ASTHMA SEVERITY (HHS-HCC): Primary | ICD-10-CM

## 2024-07-01 DIAGNOSIS — E55.9 VITAMIN D DEFICIENCY: ICD-10-CM

## 2024-07-01 DIAGNOSIS — Z00.00 ROUTINE GENERAL MEDICAL EXAMINATION AT A HEALTH CARE FACILITY: ICD-10-CM

## 2024-07-01 DIAGNOSIS — R06.83 SNORING: ICD-10-CM

## 2024-07-01 DIAGNOSIS — Z12.11 ENCOUNTER FOR SCREENING FOR MALIGNANT NEOPLASM OF COLON: ICD-10-CM

## 2024-07-01 LAB
25(OH)D3 SERPL-MCNC: 32 NG/ML (ref 30–100)
ALBUMIN SERPL BCP-MCNC: 4.6 G/DL (ref 3.4–5)
ALP SERPL-CCNC: 72 U/L (ref 33–120)
ALT SERPL W P-5'-P-CCNC: 19 U/L (ref 10–52)
ANION GAP SERPL CALC-SCNC: 14 MMOL/L (ref 10–20)
AST SERPL W P-5'-P-CCNC: 21 U/L (ref 9–39)
BASOPHILS # BLD AUTO: 0.05 X10*3/UL (ref 0–0.1)
BASOPHILS NFR BLD AUTO: 0.9 %
BILIRUB SERPL-MCNC: 0.5 MG/DL (ref 0–1.2)
BUN SERPL-MCNC: 24 MG/DL (ref 6–23)
CALCIUM SERPL-MCNC: 9.9 MG/DL (ref 8.6–10.6)
CHLORIDE SERPL-SCNC: 100 MMOL/L (ref 98–107)
CHOLEST SERPL-MCNC: 191 MG/DL (ref 0–199)
CHOLESTEROL/HDL RATIO: 3.3
CO2 SERPL-SCNC: 27 MMOL/L (ref 21–32)
CREAT SERPL-MCNC: 1.16 MG/DL (ref 0.5–1.3)
EGFRCR SERPLBLD CKD-EPI 2021: 73 ML/MIN/1.73M*2
EOSINOPHIL # BLD AUTO: 0.1 X10*3/UL (ref 0–0.7)
EOSINOPHIL NFR BLD AUTO: 1.7 %
ERYTHROCYTE [DISTWIDTH] IN BLOOD BY AUTOMATED COUNT: 12.5 % (ref 11.5–14.5)
GLUCOSE SERPL-MCNC: 94 MG/DL (ref 74–99)
HCT VFR BLD AUTO: 40.9 % (ref 41–52)
HDLC SERPL-MCNC: 58.3 MG/DL
HGB BLD-MCNC: 14.7 G/DL (ref 13.5–17.5)
IMM GRANULOCYTES # BLD AUTO: 0.02 X10*3/UL (ref 0–0.7)
IMM GRANULOCYTES NFR BLD AUTO: 0.3 % (ref 0–0.9)
LDLC SERPL CALC-MCNC: 87 MG/DL
LYMPHOCYTES # BLD AUTO: 1.06 X10*3/UL (ref 1.2–4.8)
LYMPHOCYTES NFR BLD AUTO: 18.1 %
MCH RBC QN AUTO: 35.2 PG (ref 26–34)
MCHC RBC AUTO-ENTMCNC: 35.9 G/DL (ref 32–36)
MCV RBC AUTO: 98 FL (ref 80–100)
MONOCYTES # BLD AUTO: 0.51 X10*3/UL (ref 0.1–1)
MONOCYTES NFR BLD AUTO: 8.7 %
NEUTROPHILS # BLD AUTO: 4.13 X10*3/UL (ref 1.2–7.7)
NEUTROPHILS NFR BLD AUTO: 70.3 %
NON HDL CHOLESTEROL: 133 MG/DL (ref 0–149)
NRBC BLD-RTO: 0 /100 WBCS (ref 0–0)
PLATELET # BLD AUTO: 229 X10*3/UL (ref 150–450)
POTASSIUM SERPL-SCNC: 4.1 MMOL/L (ref 3.5–5.3)
PROT SERPL-MCNC: 7.1 G/DL (ref 6.4–8.2)
PSA SERPL-MCNC: 1.03 NG/ML
RBC # BLD AUTO: 4.18 X10*6/UL (ref 4.5–5.9)
SODIUM SERPL-SCNC: 137 MMOL/L (ref 136–145)
TRIGL SERPL-MCNC: 230 MG/DL (ref 0–149)
TSH SERPL-ACNC: 1.7 MIU/L (ref 0.44–3.98)
VLDL: 46 MG/DL (ref 0–40)
WBC # BLD AUTO: 5.9 X10*3/UL (ref 4.4–11.3)

## 2024-07-01 PROCEDURE — 80053 COMPREHEN METABOLIC PANEL: CPT

## 2024-07-01 PROCEDURE — 85025 COMPLETE CBC W/AUTO DIFF WBC: CPT

## 2024-07-01 PROCEDURE — 3079F DIAST BP 80-89 MM HG: CPT | Performed by: FAMILY MEDICINE

## 2024-07-01 PROCEDURE — 80061 LIPID PANEL: CPT

## 2024-07-01 PROCEDURE — 36415 COLL VENOUS BLD VENIPUNCTURE: CPT

## 2024-07-01 PROCEDURE — 3075F SYST BP GE 130 - 139MM HG: CPT | Performed by: FAMILY MEDICINE

## 2024-07-01 PROCEDURE — 84443 ASSAY THYROID STIM HORMONE: CPT

## 2024-07-01 PROCEDURE — 82306 VITAMIN D 25 HYDROXY: CPT

## 2024-07-01 PROCEDURE — 84153 ASSAY OF PSA TOTAL: CPT

## 2024-07-01 PROCEDURE — 99396 PREV VISIT EST AGE 40-64: CPT | Performed by: FAMILY MEDICINE

## 2024-07-01 PROCEDURE — 1036F TOBACCO NON-USER: CPT | Performed by: FAMILY MEDICINE

## 2024-07-01 NOTE — PROGRESS NOTES
"Subjective   Patient ID: Jef Sheikh is a 59 y.o. male who presents for Annual Exam.    HPI     Review of Systems   All other systems reviewed and are negative.      Objective   /85   Pulse 75   Temp 36.6 °C (97.9 °F)   Ht 1.803 m (5' 11\")   Wt 86.5 kg (190 lb 12.8 oz)   BMI 26.61 kg/m²     Physical Exam  Cardiovascular:      Rate and Rhythm: Normal rate.   Pulmonary:      Breath sounds: Normal breath sounds.   Abdominal:      Palpations: Abdomen is soft.   Skin:     General: Skin is warm.   Neurological:      General: No focal deficit present.      Mental Status: He is alert.   Psychiatric:         Mood and Affect: Mood normal.         Assessment/Plan   This is a 59-year-old male patient who is here for his annual physical    I reviewed his medications he is taking all medicines appropriately as prescribed    He is continuing to snore  but losing weight and eating healthier    I am suspecting sleep apnea and he promised to get the home sleep screening test    Routine labs were done    Seen by the dermatologist ruled out any skin pathology    Advised him to come back in 6 months routine labs along with Cologuard was ordered         "

## 2024-07-08 ENCOUNTER — APPOINTMENT (OUTPATIENT)
Dept: UROLOGY | Facility: CLINIC | Age: 59
End: 2024-07-08
Payer: COMMERCIAL

## 2024-07-08 VITALS — TEMPERATURE: 97.3 F

## 2024-07-08 DIAGNOSIS — N35.919 STRICTURE OF MALE URETHRA, UNSPECIFIED STRICTURE TYPE: ICD-10-CM

## 2024-07-08 LAB
POC APPEARANCE, URINE: CLEAR
POC BILIRUBIN, URINE: NEGATIVE
POC BLOOD, URINE: NEGATIVE
POC COLOR, URINE: YELLOW
POC GLUCOSE, URINE: NEGATIVE MG/DL
POC KETONES, URINE: NEGATIVE MG/DL
POC LEUKOCYTES, URINE: NEGATIVE
POC NITRITE,URINE: NEGATIVE
POC PH, URINE: 7.5 PH
POC PROTEIN, URINE: ABNORMAL MG/DL
POC SPECIFIC GRAVITY, URINE: 1.02
POC UROBILINOGEN, URINE: 1 EU/DL

## 2024-07-08 PROCEDURE — 1036F TOBACCO NON-USER: CPT | Performed by: STUDENT IN AN ORGANIZED HEALTH CARE EDUCATION/TRAINING PROGRAM

## 2024-07-08 PROCEDURE — 51798 US URINE CAPACITY MEASURE: CPT | Performed by: STUDENT IN AN ORGANIZED HEALTH CARE EDUCATION/TRAINING PROGRAM

## 2024-07-08 PROCEDURE — 51736 URINE FLOW MEASUREMENT: CPT | Performed by: STUDENT IN AN ORGANIZED HEALTH CARE EDUCATION/TRAINING PROGRAM

## 2024-07-08 PROCEDURE — 99213 OFFICE O/P EST LOW 20 MIN: CPT | Performed by: STUDENT IN AN ORGANIZED HEALTH CARE EDUCATION/TRAINING PROGRAM

## 2024-07-08 PROCEDURE — 81003 URINALYSIS AUTO W/O SCOPE: CPT | Performed by: STUDENT IN AN ORGANIZED HEALTH CARE EDUCATION/TRAINING PROGRAM

## 2024-07-08 ASSESSMENT — PAIN SCALES - GENERAL: PAINLEVEL: 0-NO PAIN

## 2024-07-08 NOTE — PROGRESS NOTES
"Scribed for Dr. Dylan Temple by Manpreet Alvarez. I, Dr. Dylan Temple have personally reviewed and agreed with the information entered by the Virtual Scribe. 07/08/24.    ASSESSMENT:  Problem List Items Addressed This Visit       Urethral stricture    Relevant Orders    POCT UA Automated manually resulted (Completed)    Measure post void residual (Completed)      PLAN:  #Urethral stricture  S/p excision and primary anastomosis urethroplasty with me. (06/22/23)  Remains very satisfied with the results of the procedure.   Post-operatively, his stream has remained strong and he feels he empties well.   RTC in 1 year for reassessment and flow/pvr.     All questions were answered to the patient’s satisfaction.  Patient agrees with the plan and wishes to proceed.  Continue follow-up for ongoing care of his chronic medical conditions.       History of Present Illness (HPI):  Matias (\"Jef\") presents for a follow up visit.  The patient’s EMR has been reviewed.  Lives in [place], [place].   Initially referred by Dr. Davila.      H/o bulbar urethral stricture disease with weak stream and urinary retention.  S/p excision and primary anastomosis urethroplasty w/ local tissue rearrangement with me (06/22/23). Pathology was benign.     TODAY: (07/08/24)  Reports he has been doing well overall.  Remains very satisfied with the results of the procedure.   Post-operatively, his stream has remained strong and he feels he empties well.   Denies any recent UTIs, gross hematuria, dysuria, fevers or chills.     Uroflow results:   Normal bell-curve  Qmax: 9 mL/s  VV: 92 mL  PVR: 25 mL    TO REVIEW: Last visit (10/03/23)  Reports he has been doing well overall.  Remains very satisfied with the results of the procedure.   Post-operatively, his stream has remained strong and he feels he empties well.   Denies any recent UTIs, gross hematuria, dysuria, fevers or chills.   Denies any penile discomfort, discharge,  swelling or abnormal " coloration.     Uroflow results:  Normal bell-curve.   Qmax: 19 mL/s  VV: 226 mL  PVR: 17 mL     TO REVIEW: Initial visit   Previously evaluated by Dr. Davila.  Referred to me for an evaluation and surgical consideration.   Reports gradual weakening of his stream since he was a child.  S/p urethral dilation x1 in the past, maybe in his early 20's.   Over recent years he has noticed that he has had to strain more.   Thus, proceeded to be evaluated by Dr. Davila recently.  Flow/pvr demonstrated a peak flow rate of 2.7 mL/s.  Post-void residual was 1455 mL.  Renal function showed ABELARDO with GFR 45, Cr 1.75 (04/05/23)  S/p 16 Fr L[facility] cath placement, Abelardo since improved.   Latest GFR 51, Cr 1.58 (04/07/23).     Patient notes an event of developing a perineal hematoma 2/2 golfing in 2012.  Otherwise, denies an inciting event or  trauma.  Denies any dysuria, gross hematuria, flank pain, abd pain, nausea, vomiting, fevers or chills. Denies hx of recent UTIs or stones.      Review of his latest CANDIE demonstrated a hypoechoic mass of the bladder vs hematoma (04/05/23). Last PSA <1.0 (May 2022).        No past medical history on file.  Past Surgical History:   Procedure Laterality Date    OTHER SURGICAL HISTORY  04/02/2021    No history of surgery     Family History   Adopted: Yes     Social History     Tobacco Use   Smoking Status Never   Smokeless Tobacco Never     Current Outpatient Medications   Medication Sig Dispense Refill    albuterol 90 mcg/actuation inhaler Inhale 2 puffs every 4 hours if needed for wheezing. 18 g 3    azelastine (Astelin) 137 mcg (0.1 %) nasal spray Administer 2 sprays into each nostril 2 times a day.      fluticasone (Flonase) 50 mcg/actuation nasal spray Administer 2 sprays into each nostril 2 times a day.      fluticasone propion-salmeteroL (Advair Diskus) 250-50 mcg/dose diskus inhaler Inhale 1 puff 2 times a day. (Patient not taking: Reported on 12/4/2023) 60 each 3    fluticasone  propion-salmeteroL (Advair Diskus) 250-50 mcg/dose diskus inhaler Inhale 1 puff 2 times a day. 60 each 3    losartan-hydrochlorothiazide (Hyzaar) 50-12.5 mg tablet Take 1 tablet by mouth once daily. 90 tablet 3     No current facility-administered medications for this visit.     Allergies   Allergen Reactions    Weed Pollen Unknown     Past medical, surgical, family and social history in the chart was reviewed and is accurate including any additions to what is in this HPI.    REVIEW OF SYSTEMS (ROS):   Constitutional: denies any unintentional weight loss or change in strength.  Integumentary: denies any rashes or pruritus.  Eyes: denies any double vision or eye pain.  Ear/Nose/Mouth/Throat: denies any nosebleeds or gum bleeds.  Cardiovascular: denies any chest pain or syncope.  Respiratory: denies hemoptysis.  Gastrointestinal: denies nausea or vomiting.  Musculoskeletal: denies muscle cramping or weakness.  Neurologic: denies convulsions or seizures.  Hematologic/Lymphatic: denies bleeding tendencies.  Endocrine: denies heat/cold intolerance.  All other systems have been reviewed and are negative unless otherwise noted in the HPI.     OBJECTIVE:  Visit Vitals  Temp 36.3 °C (97.3 °F)     PHYSICAL EXAM:  Constitutional: No obvious distress.  Eyes: Non-injected conjunctiva, sclera clear, EOMI.  Ears/Nose/Mouth/Throat: No obvious drainage per ears or nose.  Cardiovascular: Extremities are warm and well perfused. No edema, cyanosis or pallor.  Respiratory: No audible wheezing/stridor; respirations do not appear labored.  Gastrointestinal: Abdomen soft, not distended.  Musculoskeletal: Normal ROM of extremities.  Skin: No obvious rashes or open sores.  Neurologic: Alert and oriented, CN 2-12 grossly intact.  Psychiatric: Answers questions appropriately with normal affect.  Hematologic/Lymphatic/Immunologic: No obvious bruises or sites of spontaneous bleeding.  Genitourinary: No CVA tenderness, bladder not palpable.      LABS & IMAGING:  Lab Results   Component Value Date    WBC 5.9 07/01/2024    HGB 14.7 07/01/2024    HCT 40.9 (L) 07/01/2024     07/01/2024    CHOL 191 07/01/2024    TRIG 230 (H) 07/01/2024    HDL 58.3 07/01/2024    ALT 19 07/01/2024    AST 21 07/01/2024     07/01/2024    K 4.1 07/01/2024     07/01/2024    CREATININE 1.16 07/01/2024    BUN 24 (H) 07/01/2024    CO2 27 07/01/2024    TSH 1.70 07/01/2024    INR 0.9 06/19/2023    HGBA1C 4.8 07/28/2020     Scribed for Dr. Dylan Temple by Manpreet Alvarez.  I, Dr. Dylan Temple have personally reviewed and agreed with the information entered by the Virtual Scribe. 07/08/24.

## 2024-11-16 LAB — NONINV COLON CA DNA+OCC BLD SCRN STL QL: NEGATIVE

## 2025-01-21 ENCOUNTER — APPOINTMENT (OUTPATIENT)
Dept: PRIMARY CARE | Facility: CLINIC | Age: 60
End: 2025-01-21
Payer: COMMERCIAL

## 2025-03-10 DIAGNOSIS — I10 HYPERTENSION, UNSPECIFIED TYPE: ICD-10-CM

## 2025-03-10 RX ORDER — LOSARTAN POTASSIUM AND HYDROCHLOROTHIAZIDE 12.5; 5 MG/1; MG/1
1 TABLET ORAL DAILY
Qty: 90 TABLET | Refills: 3 | Status: SHIPPED | OUTPATIENT
Start: 2025-03-10

## 2025-05-28 DIAGNOSIS — I10 HYPERTENSION, UNSPECIFIED TYPE: ICD-10-CM

## 2025-05-28 RX ORDER — LOSARTAN POTASSIUM AND HYDROCHLOROTHIAZIDE 12.5; 5 MG/1; MG/1
1 TABLET ORAL DAILY
Qty: 30 TABLET | Refills: 0 | Status: SHIPPED | OUTPATIENT
Start: 2025-05-28

## 2025-06-19 ENCOUNTER — TELEPHONE (OUTPATIENT)
Dept: PRIMARY CARE | Facility: CLINIC | Age: 60
End: 2025-06-19
Payer: COMMERCIAL

## 2025-06-19 DIAGNOSIS — I10 HYPERTENSION, UNSPECIFIED TYPE: ICD-10-CM

## 2025-06-19 RX ORDER — LOSARTAN POTASSIUM AND HYDROCHLOROTHIAZIDE 12.5; 5 MG/1; MG/1
1 TABLET ORAL DAILY
Qty: 30 TABLET | Refills: 0 | Status: SHIPPED | OUTPATIENT
Start: 2025-06-19

## 2025-06-19 NOTE — TELEPHONE ENCOUNTER
PATIENT NEEDS A REFILL ON LOSARTAN HTCZ 50/12.5 SEND TO [facility] PATIENT DOES HAVE A NEW PT APPT ON 7/29 @ 3PM BUT ONLY HAVE ABOUT THREE WEEKS LEFT OF MEDICATION

## 2025-07-14 ENCOUNTER — APPOINTMENT (OUTPATIENT)
Dept: UROLOGY | Facility: CLINIC | Age: 60
End: 2025-07-14
Payer: COMMERCIAL

## 2025-07-29 ENCOUNTER — TELEPHONE (OUTPATIENT)
Dept: PRIMARY CARE | Facility: CLINIC | Age: 60
End: 2025-07-29

## 2025-07-29 ENCOUNTER — APPOINTMENT (OUTPATIENT)
Dept: PRIMARY CARE | Facility: CLINIC | Age: 60
End: 2025-07-29
Payer: COMMERCIAL

## 2025-07-29 DIAGNOSIS — I10 HYPERTENSION, UNSPECIFIED TYPE: ICD-10-CM

## 2025-07-29 RX ORDER — LOSARTAN POTASSIUM AND HYDROCHLOROTHIAZIDE 12.5; 5 MG/1; MG/1
1 TABLET ORAL DAILY
Qty: 30 TABLET | Refills: 0 | Status: SHIPPED | OUTPATIENT
Start: 2025-07-29

## 2025-07-29 NOTE — TELEPHONE ENCOUNTER
PT called in to reschedule NPV appt due to Dr. Saxena being out and can't get back in until Sept and will need  a refill on RX: Losartan-hydrochlorothiazide for 30 days please advise    PLEASE PEND AND SEND TO DR BUENO

## 2025-09-09 ENCOUNTER — APPOINTMENT (OUTPATIENT)
Dept: PRIMARY CARE | Facility: CLINIC | Age: 60
End: 2025-09-09
Payer: COMMERCIAL